# Patient Record
Sex: FEMALE | HISPANIC OR LATINO | Employment: FULL TIME | URBAN - NONMETROPOLITAN AREA
[De-identification: names, ages, dates, MRNs, and addresses within clinical notes are randomized per-mention and may not be internally consistent; named-entity substitution may affect disease eponyms.]

---

## 2022-11-24 ENCOUNTER — OFFICE VISIT (OUTPATIENT)
Dept: URGENT CARE | Facility: CLINIC | Age: 27
End: 2022-11-24
Payer: COMMERCIAL

## 2022-11-24 VITALS
OXYGEN SATURATION: 100 % | SYSTOLIC BLOOD PRESSURE: 122 MMHG | RESPIRATION RATE: 14 BRPM | BODY MASS INDEX: 31.54 KG/M2 | DIASTOLIC BLOOD PRESSURE: 80 MMHG | HEIGHT: 63 IN | TEMPERATURE: 99.4 F | WEIGHT: 178 LBS | HEART RATE: 82 BPM

## 2022-11-24 DIAGNOSIS — J02.0 PHARYNGITIS DUE TO STREPTOCOCCUS SPECIES: ICD-10-CM

## 2022-11-24 DIAGNOSIS — J10.1 INFLUENZA A: ICD-10-CM

## 2022-11-24 DIAGNOSIS — R11.2 NAUSEA AND VOMITING, UNSPECIFIED VOMITING TYPE: ICD-10-CM

## 2022-11-24 LAB
FLUAV+FLUBV AG SPEC QL IA: NORMAL
INT CON NEG: NEGATIVE
INT CON NEG: NEGATIVE
INT CON POS: POSITIVE
INT CON POS: POSITIVE
S PYO AG THROAT QL: POSITIVE

## 2022-11-24 PROCEDURE — 87880 STREP A ASSAY W/OPTIC: CPT | Performed by: NURSE PRACTITIONER

## 2022-11-24 PROCEDURE — 87804 INFLUENZA ASSAY W/OPTIC: CPT | Performed by: NURSE PRACTITIONER

## 2022-11-24 PROCEDURE — 99203 OFFICE O/P NEW LOW 30 MIN: CPT | Performed by: NURSE PRACTITIONER

## 2022-11-24 RX ORDER — IBUPROFEN 200 MG
200 TABLET ORAL EVERY 6 HOURS PRN
COMMUNITY

## 2022-11-24 RX ORDER — OSELTAMIVIR PHOSPHATE 75 MG/1
75 CAPSULE ORAL 2 TIMES DAILY
Qty: 10 CAPSULE | Refills: 0 | Status: SHIPPED | OUTPATIENT
Start: 2022-11-24

## 2022-11-24 RX ORDER — ONDANSETRON 4 MG/1
4 TABLET, ORALLY DISINTEGRATING ORAL ONCE
Status: COMPLETED | OUTPATIENT
Start: 2022-11-24 | End: 2022-11-24

## 2022-11-24 RX ORDER — AMOXICILLIN 500 MG/1
500 CAPSULE ORAL 2 TIMES DAILY
Qty: 20 CAPSULE | Refills: 0 | Status: SHIPPED | OUTPATIENT
Start: 2022-11-24 | End: 2022-12-04

## 2022-11-24 RX ORDER — ONDANSETRON 4 MG/1
4 TABLET, ORALLY DISINTEGRATING ORAL EVERY 6 HOURS PRN
Qty: 10 TABLET | Refills: 0 | Status: SHIPPED | OUTPATIENT
Start: 2022-11-24

## 2022-11-24 RX ADMIN — ONDANSETRON 4 MG: 4 TABLET, ORALLY DISINTEGRATING ORAL at 11:00

## 2022-11-24 ASSESSMENT — ENCOUNTER SYMPTOMS
SPUTUM PRODUCTION: 1
VOMITING: 1
COUGH: 1
FEVER: 1
NAUSEA: 1
SWOLLEN GLANDS: 1
RHINORRHEA: 1
DIARRHEA: 1
SHORTNESS OF BREATH: 1
WHEEZING: 0
SORE THROAT: 1
CHILLS: 1

## 2022-11-24 NOTE — PATIENT INSTRUCTIONS
Symptomatic care.  -Oral hydration and rest.   -Over the counter supressant as directed.  -Diphenhydramine as directed for rhinorrhea (runny nose) and sneezing.  -May take over the counter pseudoephedrine for nasal congestion. Can increase your blood pressure.  -Tylenol or ibuprofen for pain and fever as directed. In children, Avoid Aspirin.   -Saline nasal spray as a decongestant.  -Infection control measures at home. Hand washing, covering sneeze/cough.  -Warm salt water gargles.  -OTC Throat lozenges or spray (Cepacol).  -Remain home from work, school, and other populated environments until at least 24 hours after you no longer have a fever.     -Follow up with primary care provider. Follow up urgently for worsening symptoms, ear pain or drainage, shortness of breath, abdominal pain, or any other concerns. Follow up emergently for trouble breathing, elevated heart rate, chest pain, signs of dehydration, dizziness, weakness, decreased urine output, confusion, persistent vomiting, severe headache, neck stiffness, persistent high grade fever.

## 2022-11-24 NOTE — PROGRESS NOTES
Subjective:     Griselda Quiroz is a 27 y.o. female who presents for Pharyngitis (Fever, chest feels on fire, hard to take a deep breath x Tuesday )      Started Tuesday. COVID test last night was negative. Burning in upper chest. Taking Nyquil and ibuprofen. No hx of COVID.     Pharyngitis   This is a new problem. The problem has been gradually worsening. Associated symptoms include coughing, diarrhea, ear pain, shortness of breath, swollen glands and vomiting.   URI   Associated symptoms include coughing, diarrhea, ear pain, nausea, rhinorrhea, a sore throat, swollen glands and vomiting. Pertinent negatives include no wheezing.     History reviewed. No pertinent past medical history.    History reviewed. No pertinent surgical history.    Social History     Socioeconomic History    Marital status:      Spouse name: Not on file    Number of children: Not on file    Years of education: Not on file    Highest education level: Not on file   Occupational History    Not on file   Tobacco Use    Smoking status: Not on file    Smokeless tobacco: Not on file   Substance and Sexual Activity    Alcohol use: Not on file    Drug use: Not on file    Sexual activity: Not on file   Other Topics Concern    Not on file   Social History Narrative    ** Merged History Encounter **          Social Determinants of Health     Financial Resource Strain: Not on file   Food Insecurity: Not on file   Transportation Needs: Not on file   Physical Activity: Not on file   Stress: Not on file   Social Connections: Not on file   Intimate Partner Violence: Not on file   Housing Stability: Not on file        History reviewed. No pertinent family history.     No Known Allergies    Review of Systems   Constitutional:  Positive for chills, fever and malaise/fatigue.   HENT:  Positive for ear pain, rhinorrhea and sore throat.    Respiratory:  Positive for cough, sputum production and shortness of breath. Negative for wheezing.   "  Gastrointestinal:  Positive for diarrhea, nausea and vomiting.   All other systems reviewed and are negative.     Objective:   /80 (BP Location: Left arm, Patient Position: Sitting, BP Cuff Size: Adult)   Pulse 82   Temp 37.4 °C (99.4 °F) (Temporal)   Resp 14   Ht 1.6 m (5' 3\")   Wt 80.7 kg (178 lb)   SpO2 100%   BMI 31.53 kg/m²     Physical Exam  Vitals reviewed.   Constitutional:       General: She is not in acute distress.     Appearance: She is well-developed. She is ill-appearing. She is not toxic-appearing.   HENT:      Head: Normocephalic and atraumatic.      Right Ear: Tympanic membrane, ear canal and external ear normal. Tympanic membrane is not erythematous.      Left Ear: Tympanic membrane, ear canal and external ear normal. Tympanic membrane is not erythematous.      Nose: Rhinorrhea present.      Mouth/Throat:      Lips: Pink.      Mouth: Mucous membranes are moist. No oral lesions.      Pharynx: Uvula midline. Posterior oropharyngeal erythema present.      Tonsils: No tonsillar exudate.   Eyes:      Conjunctiva/sclera: Conjunctivae normal.   Cardiovascular:      Rate and Rhythm: Normal rate.   Pulmonary:      Effort: Pulmonary effort is normal. No respiratory distress.      Breath sounds: No stridor. No wheezing, rhonchi or rales.   Musculoskeletal:         General: Normal range of motion.      Cervical back: Normal range of motion and neck supple.   Lymphadenopathy:      Head:      Right side of head: Tonsillar adenopathy present.      Left side of head: Tonsillar adenopathy present.   Skin:     General: Skin is warm and dry.      Findings: No rash.   Neurological:      General: No focal deficit present.      Mental Status: She is alert and oriented to person, place, and time.      GCS: GCS eye subscore is 4. GCS verbal subscore is 5. GCS motor subscore is 6.   Psychiatric:         Mood and Affect: Mood normal.         Speech: Speech normal.         Behavior: Behavior normal.         " Thought Content: Thought content normal.         Judgment: Judgment normal.      Comments: Tearful, stating she feels nauseous, and has not felt sick like this before.        Assessment/Plan:   1. Influenza A  - POCT Influenza A/B  - oseltamivir (TAMIFLU) 75 MG Cap; Take 1 Capsule by mouth 2 times a day.  Dispense: 10 Capsule; Refill: 0    2. Pharyngitis due to Streptococcus species  - POCT Rapid Strep A  - amoxicillin (AMOXIL) 500 MG Cap; Take 1 Capsule by mouth 2 times a day for 10 days.  Dispense: 20 Capsule; Refill: 0    3. Nausea and vomiting, unspecified vomiting type  - ondansetron (ZOFRAN ODT) dispertab 4 mg  - ondansetron (ZOFRAN ODT) 4 MG TABLET DISPERSIBLE; Take 1 Tablet by mouth every 6 hours as needed for Nausea/Vomiting.  Dispense: 10 Tablet; Refill: 0    Other orders  - DM-Doxylamine-Acetaminophen (NYQUIL COLD & FLU PO); Take  by mouth.  - ibuprofen (MOTRIN) 200 MG Tab; Take 200 mg by mouth every 6 hours as needed.  Results for orders placed or performed in visit on 11/24/22   POCT Influenza A/B   Result Value Ref Range    Rapid Influenza A-B POSTIVE     Internal Control Positive Positive     Internal Control Negative Negative    POCT Rapid Strep A   Result Value Ref Range    Rapid Strep Screen POSITIVE     Internal Control Positive Positive     Internal Control Negative Negative    Symptomatic care.  -Oral hydration and rest.   -Over the counter supressant as directed.  -Diphenhydramine as directed for rhinorrhea (runny nose) and sneezing.  -May take over the counter pseudoephedrine for nasal congestion. Can increase your blood pressure.  -Tylenol or ibuprofen for pain and fever as directed. In children, Avoid Aspirin.   -Saline nasal spray as a decongestant.  -Infection control measures at home. Hand washing, covering sneeze/cough.  -Warm salt water gargles.  -OTC Throat lozenges or spray (Cepacol).  -Remain home from work, school, and other populated environments until at least 24 hours after you  no longer have a fever.     -Follow up with primary care provider. Follow up urgently for worsening symptoms, ear pain or drainage, shortness of breath, abdominal pain, or any other concerns. Follow up emergently for trouble breathing, elevated heart rate, chest pain, signs of dehydration, dizziness, weakness, decreased urine output, confusion, persistent vomiting, severe headache, neck stiffness, persistent high grade fever.    -Discussed viral etiology of Influenza; associated complications, including risk of pneumonia and ear infections. S&S of PNA with follow up. Stable Vitals.    Differential diagnosis, natural history, supportive care, and indications for immediate follow-up discussed.

## 2025-02-24 ENCOUNTER — NON-PROVIDER VISIT (OUTPATIENT)
Dept: URGENT CARE | Facility: CLINIC | Age: 30
End: 2025-02-24

## 2025-02-24 DIAGNOSIS — Z02.1 PRE-EMPLOYMENT DRUG SCREENING: ICD-10-CM

## 2025-02-24 LAB
AMP AMPHETAMINE: NORMAL
COC COCAINE: NORMAL
INT CON NEG: NEGATIVE
INT CON POS: POSITIVE
MET METHAMPHETAMINES: NORMAL
OPI OPIATES: NORMAL
PCP PHENCYCLIDINE: NORMAL
POC DRUG COMMENT 753798-OCCUPATIONAL HEALTH: NEGATIVE
THC: NORMAL

## 2025-02-24 PROCEDURE — 80305 DRUG TEST PRSMV DIR OPT OBS: CPT

## 2025-02-24 NOTE — PROGRESS NOTES
Griselda Quiroz is a 29 y.o. female here for a non-provider visit for Pre employment 6 panel for [facility]     If abnormal was an in office provider notified today (if so, indicate provider)? No    Routed to PCP? No